# Patient Record
Sex: FEMALE | URBAN - METROPOLITAN AREA
[De-identification: names, ages, dates, MRNs, and addresses within clinical notes are randomized per-mention and may not be internally consistent; named-entity substitution may affect disease eponyms.]

---

## 2022-06-22 ENCOUNTER — OFFICE (OUTPATIENT)
Dept: URBAN - METROPOLITAN AREA CLINIC 79 | Facility: CLINIC | Age: 32
End: 2022-06-22
Payer: MEDICAID

## 2022-06-22 VITALS
WEIGHT: 111 LBS | DIASTOLIC BLOOD PRESSURE: 72 MMHG | HEIGHT: 64 IN | HEART RATE: 72 BPM | TEMPERATURE: 96.7 F | SYSTOLIC BLOOD PRESSURE: 99 MMHG

## 2022-06-22 DIAGNOSIS — R63.4 ABNORMAL WEIGHT LOSS: ICD-10-CM

## 2022-06-22 DIAGNOSIS — R13.10 DYSPHAGIA, UNSPECIFIED: ICD-10-CM

## 2022-06-22 DIAGNOSIS — R11.0 NAUSEA: ICD-10-CM

## 2022-06-22 PROCEDURE — 99204 OFFICE O/P NEW MOD 45 MIN: CPT

## 2022-06-22 NOTE — SERVICEHPINOTES
30 yo female present for dysphagia referred by Estelle Florian ([facility]). br
Pt reports that she has been difficult swallowing for 3 weeks. Dysphagia to solid and liquid.   +voice change. +nausea. States " I am hungry". Lost 25 lbs for 3 weeks. She feels like the foods gets stuck in her throat. States that throat is closing.br No cough or hoarseness. No vomiting. No acid reflux. No NSAID. No food allergy. no fever, chills or night sweats. no abd pain. br She went to [facility] on 06/17/22 for dysphagia but all her labs unremarkable (no signs of dehydration or malnutrition), referred to GI here. 
brFL Esophagram 06/17/22 Limited, nondiagnostic exam due to pt's severely delayed initiation of the swallow and limited ingestion of barium. br Never done EGD. No family hx of food impactions or esophageal dilation.   GB removed 03/2022. No heart or lung issues.

## 2022-06-23 ENCOUNTER — ON CAMPUS - OUTPATIENT (OUTPATIENT)
Dept: URBAN - METROPOLITAN AREA HOSPITAL 16 | Facility: HOSPITAL | Age: 32
End: 2022-06-23
Payer: MEDICAID

## 2022-06-23 DIAGNOSIS — R63.4 ABNORMAL WEIGHT LOSS: ICD-10-CM

## 2022-06-23 DIAGNOSIS — R13.10 DYSPHAGIA, UNSPECIFIED: ICD-10-CM

## 2022-06-23 DIAGNOSIS — K29.60 OTHER GASTRITIS WITHOUT BLEEDING: ICD-10-CM

## 2022-06-23 DIAGNOSIS — K31.89 OTHER DISEASES OF STOMACH AND DUODENUM: ICD-10-CM

## 2022-06-23 DIAGNOSIS — R11.0 NAUSEA: ICD-10-CM

## 2022-06-23 PROCEDURE — 43239 EGD BIOPSY SINGLE/MULTIPLE: CPT | Mod: 59 | Performed by: INTERNAL MEDICINE

## 2022-06-23 PROCEDURE — 43249 ESOPH EGD DILATION <30 MM: CPT | Performed by: INTERNAL MEDICINE

## 2022-08-03 ENCOUNTER — OFFICE (OUTPATIENT)
Dept: URBAN - METROPOLITAN AREA CLINIC 79 | Facility: CLINIC | Age: 32
End: 2022-08-03
Payer: MEDICAID

## 2022-08-03 VITALS
DIASTOLIC BLOOD PRESSURE: 73 MMHG | WEIGHT: 116 LBS | SYSTOLIC BLOOD PRESSURE: 104 MMHG | HEIGHT: 64 IN | HEART RATE: 68 BPM | TEMPERATURE: 98.2 F

## 2022-08-03 DIAGNOSIS — R13.10 DYSPHAGIA, UNSPECIFIED: ICD-10-CM

## 2022-08-03 PROCEDURE — 99214 OFFICE O/P EST MOD 30 MIN: CPT | Performed by: PHYSICIAN ASSISTANT

## 2022-08-03 NOTE — SERVICEHPINOTES
31 yo female presents for f/u dysphagia. She had an episode of food getting stuck a few months ago, and then had severe dysphagia issues to both solids and liquids and lost over 20 pounds. Attempted barium swallow but it was non-diagnostic due to pt's severely delayed initiation of the swallow and limited ingestion of barium.br
br She had an EGD with empiric dilation ([address]) on June 23rd. She had mild erythema in lower esophagus and moderate in stomach. Path showed normal small bowel, no celiac dz, gastritis seen, no h pylori seen, mild esophagitis, 
br
br She reports 40% improvement in symptoms with the dilation. She still has dysphagia but is managing with using more soft and liquid foods. No pain with swallowing. br
br She is on pantoprazole (granules) as of the past few weeks and states this helps with heartburn. 
br

br She is wanting further dilation to be done. No other concerns today. Feels well otherwise. Has gained a few pounds back since last visit. br visited="true"

## 2022-08-04 ENCOUNTER — ON CAMPUS - OUTPATIENT (OUTPATIENT)
Dept: URBAN - METROPOLITAN AREA HOSPITAL 65 | Facility: HOSPITAL | Age: 32
End: 2022-08-04
Payer: MEDICAID

## 2022-08-04 DIAGNOSIS — R63.4 ABNORMAL WEIGHT LOSS: ICD-10-CM

## 2022-08-04 DIAGNOSIS — R11.0 NAUSEA: ICD-10-CM

## 2022-08-04 DIAGNOSIS — R13.10 DYSPHAGIA, UNSPECIFIED: ICD-10-CM

## 2022-08-04 PROCEDURE — 43235 EGD DIAGNOSTIC BRUSH WASH: CPT | Performed by: INTERNAL MEDICINE

## 2022-08-04 PROCEDURE — 43450 DILATE ESOPHAGUS 1/MULT PASS: CPT | Mod: 59 | Performed by: INTERNAL MEDICINE
